# Patient Record
Sex: FEMALE | URBAN - METROPOLITAN AREA
[De-identification: names, ages, dates, MRNs, and addresses within clinical notes are randomized per-mention and may not be internally consistent; named-entity substitution may affect disease eponyms.]

---

## 2023-02-18 ENCOUNTER — NURSE TRIAGE (OUTPATIENT)
Dept: NURSING | Facility: CLINIC | Age: 88
End: 2023-02-18

## 2023-02-18 NOTE — TELEPHONE ENCOUNTER
Nurse Triage SBAR    Is this a 2nd Level Triage? NO    Situation: Patient has swelling, redness, and drainage at her right ankle    Background: [place] patient  Denies cardiac, liver, kidney or cancer history  Taking 2 medications: Losartan and pravastatin     Assessment: states she has a problem with her right ankle  Swollen and red- denies warmth or pain  Liquid is coming out- clear fluid    - swelling is localized to ankle  Swelling started over the last week  Notes that she does have some sores on her ankle as well- redness around the sores  Denies chest pain or trouble breathing   Denies fevers   States she may have had some swelling in her ankles from time to time but nothing like this     Protocol Recommended Disposition:   See HCP Within 4 Hours (Or PCP Triage)    Recommendation: Advised patient to be seen in UC today for evaluation. Patient is agreeable to being seen and will go to the  in [place] with [facility] [place].       today    Does the patient meet one of the following criteria for ADS visit consideration? No    Reason for Disposition    [1] Thigh, calf, or ankle swelling AND [2] only 1 side    Additional Information    Negative: SEVERE difficulty breathing (e.g., struggling for each breath, speaks in single words)    Negative: Looks like a broken bone or dislocated joint (e.g., crooked or deformed)    Negative: Sounds like a life-threatening emergency to the triager    Negative: Chest pain    Negative: Followed a leg injury    Negative: [1] Small area of swelling AND [2] followed an insect bite to the area    Negative: Swelling of one ankle joint    Negative: Swelling of knee is main symptom    Negative: Pregnant    Negative: Postpartum (from 0 to 6 weeks after delivery)    Negative: [1] Heart failure suspected (e.g., ankle swelling, shortness of breath, weight gain) AND [2] recently in hospital for heart failure    Negative: Difficulty breathing at rest    Negative: Entire foot is cool or blue in  comparison to other side    Negative: [1] Can't walk or can barely walk AND [2] new-onset    Negative: [1] Difficulty breathing with exertion (e.g., walking) AND [2] new-onset or worsening    Negative: [1] Red area or streak AND [2] fever    Negative: [1] Swelling is painful to touch AND [2] fever    Negative: [1] Cast on leg or ankle AND [2] now increased pain    Negative: Patient sounds very sick or weak to the triager    Negative: SEVERE leg swelling (e.g., swelling extends above knee, entire leg is swollen, weeping fluid)    Negative: [1] Red area or streak [2] large (> 2 in. or 5 cm)    Negative: [1] Thigh or calf pain AND [2] only 1 side AND [3] present > 1 hour    Protocols used: LEG SWELLING AND EDEMA-A-AH